# Patient Record
Sex: FEMALE | Race: WHITE | NOT HISPANIC OR LATINO | URBAN - METROPOLITAN AREA
[De-identification: names, ages, dates, MRNs, and addresses within clinical notes are randomized per-mention and may not be internally consistent; named-entity substitution may affect disease eponyms.]

---

## 2017-02-10 ENCOUNTER — APPOINTMENT (RX ONLY)
Dept: URBAN - METROPOLITAN AREA CLINIC 167 | Facility: CLINIC | Age: 32
Setting detail: DERMATOLOGY
End: 2017-02-10

## 2017-02-10 DIAGNOSIS — L70.0 ACNE VULGARIS: ICD-10-CM

## 2017-02-10 PROCEDURE — ? TREATMENT REGIMEN

## 2017-02-10 PROCEDURE — ? PRESCRIPTION

## 2017-02-10 PROCEDURE — ? COUNSELING

## 2017-02-10 PROCEDURE — 99202 OFFICE O/P NEW SF 15 MIN: CPT

## 2017-02-10 PROCEDURE — ? OTHER

## 2017-02-10 RX ORDER — TAZAROTENE 1 MG/G
GEL CUTANEOUS
Qty: 1 | Refills: 3 | Status: ERX | COMMUNITY
Start: 2017-02-10

## 2017-02-10 RX ORDER — DAPSONE 75 MG/G
GEL TOPICAL
Qty: 1 | Refills: 5 | Status: ERX | COMMUNITY
Start: 2017-02-10

## 2017-02-10 RX ORDER — DOXYCYCLINE 100 MG/1
CAPSULE ORAL
Qty: 30 | Refills: 3 | Status: ERX | COMMUNITY
Start: 2017-02-10

## 2017-02-10 RX ORDER — TRETINOIN 0.05 G/100G
GEL TOPICAL
Qty: 1 | Refills: 3 | Status: ERX | COMMUNITY
Start: 2017-02-10

## 2017-02-10 RX ORDER — CLINDAMYCIN PHOSPHATE 10 MG/ML
SOLUTION TOPICAL
Qty: 1 | Refills: 5 | Status: ERX | COMMUNITY
Start: 2017-02-10

## 2017-02-10 RX ADMIN — DAPSONE: 75 GEL TOPICAL at 15:58

## 2017-02-10 RX ADMIN — TRETINOIN: 0.05 GEL TOPICAL at 15:58

## 2017-02-10 RX ADMIN — TAZAROTENE: 1 GEL CUTANEOUS at 15:58

## 2017-02-10 RX ADMIN — DOXYCYCLINE: 100 CAPSULE ORAL at 15:58

## 2017-02-10 RX ADMIN — CLINDAMYCIN PHOSPHATE: 10 SOLUTION TOPICAL at 15:58

## 2017-02-10 NOTE — PROCEDURE: TREATMENT REGIMEN
Samples Given: Aczone 7.5%\\nCerave PM \\nCerave moistuizer \\nCerave gentle cleanser\\nCetaphil gentle cleanser \\nVanicream gentle cleasner\\nVanicream with SPF\\nAveeno moisturizer with SPF\\nNeutrogena hydro boost \\nNeutrogena baby, gentle cleanser, daily protection
Initiate Treatment: Aczone 7.5% massage into the red inflamed bumps twice a day \\nTazorac 0.1% gel apply to the affected areas of the back as tolerated \\nAtralin 0.05% apply a pea sized amount to the entire face every night at bedtime as tolerated
Continue Regimen: Clarisonic brush with gentle cleanser\\nOkay to continue with peels
Plan: Nexcare acne pads for draining pustules\\nAmra \\nSkin care by Dina\\Bryanna Bates at Dr. Fajardo
Detail Level: Zone
Otc Regimen: Panoxyl 10% in the shower

## 2017-02-10 NOTE — PROCEDURE: OTHER
Other (Free Text): Acne on face and back present for years\\nCystic acne on chin around period\\nAcne does get worse around her period\\nBack is worse than face\\nPatient gets chemical peels on back and face and seems to help\\nPatient has used aldactone in the past but did not like it due to weight gain\\nPatient has also used differin and clindamycin\\nPatient currently using clindamycin solution as a spot treatment as needed\\nHelps the little ones, but not on back\\n\\nPatient does not have sensitive skin\\nPatient is interested in Finacea, heard it can be helpful\\nPatient also interested in antibiotic.\\n\\nBPO wash helps\\nStill breaks out
Detail Level: Detailed
Note Text (......Xxx Chief Complaint.): This diagnosis correlates with the